# Patient Record
Sex: MALE | URBAN - METROPOLITAN AREA
[De-identification: names, ages, dates, MRNs, and addresses within clinical notes are randomized per-mention and may not be internally consistent; named-entity substitution may affect disease eponyms.]

---

## 2022-02-24 ENCOUNTER — OFFICE VISIT (OUTPATIENT)
Dept: URBAN - METROPOLITAN AREA CLINIC 27 | Facility: CLINIC | Age: 47
End: 2022-02-24
Payer: COMMERCIAL

## 2022-02-24 DIAGNOSIS — H47.011 ISCHEMIC OPTIC NEUROPATHY, RIGHT EYE: Primary | ICD-10-CM

## 2022-02-24 DIAGNOSIS — H31.321 CHOROIDAL RUPTURE, RIGHT EYE: ICD-10-CM

## 2022-02-24 DIAGNOSIS — H21.531 IRIDODIALYSIS, RIGHT EYE: ICD-10-CM

## 2022-02-24 PROCEDURE — 92235 FLUORESCEIN ANGRPH MLTIFRAME: CPT | Performed by: OPHTHALMOLOGY

## 2022-02-24 PROCEDURE — 99204 OFFICE O/P NEW MOD 45 MIN: CPT | Performed by: OPHTHALMOLOGY

## 2022-02-24 ASSESSMENT — INTRAOCULAR PRESSURE
OS: 14
OD: 24

## 2022-02-24 NOTE — IMPRESSION/PLAN
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 Plan: --status-post high speed metallic FB injury 3 weeks ago
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
--discussed findings with patient in detail --limited visual potential, goal is comfort only
--will monitor optic and retinal status periodically RTC 3 months for DFE/FA/OCT OD

## 2022-02-24 NOTE — IMPRESSION/PLAN
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  Plan: --traumatic choroidal rupture confirmed on exam and FA
--no evidence of CNVM formation at this time
--will monitor for development of CNVM

## 2022-02-24 NOTE — IMPRESSION/PLAN
Impression: Iridodialysis, right eye: H21.531 Right.  Plan: --traumatic iridodialysis, followed by Dr. Margo Pruitt
--continue gtts for IOP control as directed

## 2022-06-28 ENCOUNTER — OFFICE VISIT (OUTPATIENT)
Dept: URBAN - METROPOLITAN AREA CLINIC 27 | Facility: CLINIC | Age: 47
End: 2022-06-28
Payer: COMMERCIAL

## 2022-06-28 DIAGNOSIS — H21.531 IRIDODIALYSIS, RIGHT EYE: ICD-10-CM

## 2022-06-28 DIAGNOSIS — H31.321 CHOROIDAL RUPTURE, RIGHT EYE: ICD-10-CM

## 2022-06-28 DIAGNOSIS — H47.011 ISCHEMIC OPTIC NEUROPATHY, RIGHT EYE: Primary | ICD-10-CM

## 2022-06-28 PROCEDURE — 92235 FLUORESCEIN ANGRPH MLTIFRAME: CPT | Performed by: OPHTHALMOLOGY

## 2022-06-28 PROCEDURE — 99213 OFFICE O/P EST LOW 20 MIN: CPT | Performed by: OPHTHALMOLOGY

## 2022-06-28 PROCEDURE — 92134 CPTRZ OPH DX IMG PST SGM RTA: CPT | Performed by: OPHTHALMOLOGY

## 2022-06-28 ASSESSMENT — INTRAOCULAR PRESSURE
OD: 12
OS: 13

## 2022-06-28 NOTE — IMPRESSION/PLAN
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  Plan: --traumatic choroidal rupture confirmed on exam 
--SRH has resolved, no evidence of CNVM on FA
--observe

## 2022-06-28 NOTE — IMPRESSION/PLAN
Impression: Iridodialysis, right eye: H21.531 Right.  Plan: --traumatic iridodialysis, followed by Dr. Priyanka Mckeon
--continue gtts for IOP control as directed

## 2022-06-28 NOTE — IMPRESSION/PLAN
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 Plan: --status-post high speed metallic FB injury OD
--status-post subconjunctival FB removal 
--LP with RAPD, secondary to traumatic optic neuropathy
--discussed findings with patient in detail --limited visual potential, goal is comfort only RTC PRN